# Patient Record
Sex: MALE | Race: WHITE | NOT HISPANIC OR LATINO | Employment: FULL TIME | URBAN - METROPOLITAN AREA
[De-identification: names, ages, dates, MRNs, and addresses within clinical notes are randomized per-mention and may not be internally consistent; named-entity substitution may affect disease eponyms.]

---

## 2024-02-10 ASSESSMENT — ENCOUNTER SYMPTOMS
NAUSEA: 0
SHORTNESS OF BREATH: 0
FATIGUE: 0
DIZZINESS: 0
VOMITING: 0
COUGH: 0
FEVER: 0
MYALGIAS: 0

## 2024-02-10 NOTE — PROGRESS NOTES
Subjective   Patient ID: Jacky Duong is a 38 y.o. male who presents for Establish Care (Pt is here to establish care / nr), Mass (Pt would like to discuss about the bump on left eye / nr), Anxiety (Pt would like to discuss about his anxiety that has been going on for about a year / nr), and blood work (Pt would like to have basic bloodwork orders ).    HPI   L lower eye skin tag - requesting removal.    Anxiety - intrusive thoughts, dwelling on upcoming events. Boys w/ADHD + anxiety. Rare physical sxs. Has never been treated for anxiety. Is interested in medication but wary of SSRI side effects.        Review of Systems   Constitutional:  Negative for fatigue and fever.   Respiratory:  Negative for cough and shortness of breath.    Cardiovascular:  Negative for chest pain.   Gastrointestinal:  Negative for nausea and vomiting.   Musculoskeletal:  Negative for myalgias.   Skin:  Negative for rash.   Neurological:  Negative for dizziness.   Psychiatric/Behavioral:  The patient is nervous/anxious.        Objective   /76   Pulse 82   Wt 100 kg (221 lb 3.2 oz)   SpO2 98%     Physical Exam  Vitals reviewed.   Constitutional:       Appearance: Normal appearance.   HENT:      Head: Normocephalic and atraumatic.      Right Ear: There is impacted cerumen.      Left Ear: There is impacted cerumen.      Nose: Nose normal.      Mouth/Throat:      Mouth: Mucous membranes are moist.      Pharynx: No oropharyngeal exudate.   Eyes:      Extraocular Movements: Extraocular movements intact.      Conjunctiva/sclera: Conjunctivae normal.      Pupils: Pupils are equal, round, and reactive to light.   Cardiovascular:      Rate and Rhythm: Normal rate and regular rhythm.      Heart sounds: Normal heart sounds.   Pulmonary:      Effort: Pulmonary effort is normal.      Breath sounds: Normal breath sounds. No wheezing.   Abdominal:      General: There is no distension.      Palpations: Abdomen is soft.      Tenderness: There  is no abdominal tenderness.   Musculoskeletal:         General: No tenderness.      Cervical back: Normal range of motion and neck supple.   Skin:     General: Skin is warm and dry.      Findings: No rash.      Comments: Skin tag L lower eye   Neurological:      General: No focal deficit present.      Mental Status: He is alert. Mental status is at baseline.   Psychiatric:         Mood and Affect: Mood normal.         Assessment/Plan   Problem List Items Addressed This Visit             ICD-10-CM    Skin tag L91.8     Informed consent obtained. Area cleansed with alcohol swab. Forceps used to lift the skin tag - excised at the base with sterile scissors. Minimal to no bleeding. Covered with bacitracin. Return precautions discussed.          Other Visit Diagnoses         Codes    Encounter to establish care    -  Primary Z76.89    Lipid screening     Z13.220    Relevant Orders    Lipid Panel    Screening for diabetes mellitus     Z13.1    Relevant Orders    Comprehensive Metabolic Panel    BENOIT (generalized anxiety disorder)     F41.1    Relevant Medications    busPIRone (Buspar) 5 mg tablet          Will start w/buspar. To FU in 2 weeks for dose adjustment.

## 2024-02-12 ENCOUNTER — OFFICE VISIT (OUTPATIENT)
Dept: PRIMARY CARE | Facility: CLINIC | Age: 39
End: 2024-02-12
Payer: COMMERCIAL

## 2024-02-12 ENCOUNTER — LAB (OUTPATIENT)
Dept: LAB | Facility: LAB | Age: 39
End: 2024-02-12
Payer: COMMERCIAL

## 2024-02-12 VITALS
SYSTOLIC BLOOD PRESSURE: 132 MMHG | OXYGEN SATURATION: 98 % | WEIGHT: 221.2 LBS | DIASTOLIC BLOOD PRESSURE: 76 MMHG | HEART RATE: 82 BPM

## 2024-02-12 DIAGNOSIS — Z13.220 LIPID SCREENING: ICD-10-CM

## 2024-02-12 DIAGNOSIS — Z13.1 SCREENING FOR DIABETES MELLITUS: ICD-10-CM

## 2024-02-12 DIAGNOSIS — F41.1 GAD (GENERALIZED ANXIETY DISORDER): ICD-10-CM

## 2024-02-12 DIAGNOSIS — H61.23 BILATERAL IMPACTED CERUMEN: ICD-10-CM

## 2024-02-12 DIAGNOSIS — L91.8 SKIN TAG: ICD-10-CM

## 2024-02-12 DIAGNOSIS — Z76.89 ENCOUNTER TO ESTABLISH CARE: Primary | ICD-10-CM

## 2024-02-12 LAB
ALBUMIN SERPL-MCNC: 5 G/DL (ref 3.5–5)
ALP BLD-CCNC: 53 U/L (ref 35–125)
ALT SERPL-CCNC: 101 U/L (ref 5–40)
ANION GAP SERPL CALC-SCNC: 12 MMOL/L
AST SERPL-CCNC: 45 U/L (ref 5–40)
BILIRUB SERPL-MCNC: 0.6 MG/DL (ref 0.1–1.2)
BUN SERPL-MCNC: 15 MG/DL (ref 8–25)
CALCIUM SERPL-MCNC: 9.7 MG/DL (ref 8.5–10.4)
CHLORIDE SERPL-SCNC: 102 MMOL/L (ref 97–107)
CHOLEST SERPL-MCNC: 188 MG/DL (ref 133–200)
CHOLEST/HDLC SERPL: 5.4 {RATIO}
CO2 SERPL-SCNC: 25 MMOL/L (ref 24–31)
CREAT SERPL-MCNC: 0.8 MG/DL (ref 0.4–1.6)
EGFRCR SERPLBLD CKD-EPI 2021: >90 ML/MIN/1.73M*2
GLUCOSE SERPL-MCNC: 98 MG/DL (ref 65–99)
HDLC SERPL-MCNC: 35 MG/DL
LDLC SERPL CALC-MCNC: 111 MG/DL (ref 65–130)
POTASSIUM SERPL-SCNC: 4.2 MMOL/L (ref 3.4–5.1)
PROT SERPL-MCNC: 7.8 G/DL (ref 5.9–7.9)
SODIUM SERPL-SCNC: 139 MMOL/L (ref 133–145)
TRIGL SERPL-MCNC: 208 MG/DL (ref 40–150)

## 2024-02-12 PROCEDURE — 80061 LIPID PANEL: CPT

## 2024-02-12 PROCEDURE — 80053 COMPREHEN METABOLIC PANEL: CPT

## 2024-02-12 PROCEDURE — 36415 COLL VENOUS BLD VENIPUNCTURE: CPT

## 2024-02-12 PROCEDURE — 11200 RMVL SKIN TAGS UP TO&INC 15: CPT | Performed by: PHYSICIAN ASSISTANT

## 2024-02-12 PROCEDURE — 99203 OFFICE O/P NEW LOW 30 MIN: CPT | Performed by: PHYSICIAN ASSISTANT

## 2024-02-12 PROCEDURE — 1036F TOBACCO NON-USER: CPT | Performed by: PHYSICIAN ASSISTANT

## 2024-02-12 RX ORDER — BUSPIRONE HYDROCHLORIDE 5 MG/1
5 TABLET ORAL 2 TIMES DAILY
Qty: 60 TABLET | Refills: 1 | Status: SHIPPED | OUTPATIENT
Start: 2024-02-12 | End: 2024-03-04

## 2024-02-12 ASSESSMENT — PATIENT HEALTH QUESTIONNAIRE - PHQ9
3. TROUBLE FALLING OR STAYING ASLEEP OR SLEEPING TOO MUCH: SEVERAL DAYS
7. TROUBLE CONCENTRATING ON THINGS, SUCH AS READING THE NEWSPAPER OR WATCHING TELEVISION: SEVERAL DAYS
6. FEELING BAD ABOUT YOURSELF - OR THAT YOU ARE A FAILURE OR HAVE LET YOURSELF OR YOUR FAMILY DOWN: NOT AT ALL
SUM OF ALL RESPONSES TO PHQ9 QUESTIONS 1 AND 2: 6
SUM OF ALL RESPONSES TO PHQ QUESTIONS 1-9: 12
10. IF YOU CHECKED OFF ANY PROBLEMS, HOW DIFFICULT HAVE THESE PROBLEMS MADE IT FOR YOU TO DO YOUR WORK, TAKE CARE OF THINGS AT HOME, OR GET ALONG WITH OTHER PEOPLE: SOMEWHAT DIFFICULT
5. POOR APPETITE OR OVEREATING: NOT AT ALL
9. THOUGHTS THAT YOU WOULD BE BETTER OFF DEAD, OR OF HURTING YOURSELF: NOT AT ALL
1. LITTLE INTEREST OR PLEASURE IN DOING THINGS: NOT AT ALL
1. LITTLE INTEREST OR PLEASURE IN DOING THINGS: NEARLY EVERY DAY
SUM OF ALL RESPONSES TO PHQ9 QUESTIONS 1 AND 2: 0
2. FEELING DOWN, DEPRESSED OR HOPELESS: NEARLY EVERY DAY
4. FEELING TIRED OR HAVING LITTLE ENERGY: NEARLY EVERY DAY
2. FEELING DOWN, DEPRESSED OR HOPELESS: NOT AT ALL
8. MOVING OR SPEAKING SO SLOWLY THAT OTHER PEOPLE COULD HAVE NOTICED. OR THE OPPOSITE, BEING SO FIGETY OR RESTLESS THAT YOU HAVE BEEN MOVING AROUND A LOT MORE THAN USUAL: SEVERAL DAYS

## 2024-02-12 ASSESSMENT — PAIN SCALES - GENERAL: PAINLEVEL: 0-NO PAIN

## 2024-02-12 ASSESSMENT — COLUMBIA-SUICIDE SEVERITY RATING SCALE - C-SSRS
2. HAVE YOU ACTUALLY HAD ANY THOUGHTS OF KILLING YOURSELF?: NO
6. HAVE YOU EVER DONE ANYTHING, STARTED TO DO ANYTHING, OR PREPARED TO DO ANYTHING TO END YOUR LIFE?: NO
1. IN THE PAST MONTH, HAVE YOU WISHED YOU WERE DEAD OR WISHED YOU COULD GO TO SLEEP AND NOT WAKE UP?: NO

## 2024-02-12 ASSESSMENT — ENCOUNTER SYMPTOMS: NERVOUS/ANXIOUS: 1

## 2024-02-12 NOTE — ASSESSMENT & PLAN NOTE
Informed consent obtained. Area cleansed with alcohol swab. Forceps used to lift the skin tag - excised at the base with sterile scissors. Minimal to no bleeding. Covered with bacitracin. Return precautions discussed.

## 2024-03-03 ASSESSMENT — ENCOUNTER SYMPTOMS
COUGH: 0
FATIGUE: 0
SHORTNESS OF BREATH: 0
VOMITING: 0
FEVER: 0
NAUSEA: 0
MYALGIAS: 0
DIZZINESS: 0

## 2024-03-03 NOTE — PROGRESS NOTES
Subjective   Patient ID: Jacky Duong is a 38 y.o. male who presents for Follow-up (Pt is here for medication and lab results  / nr).    HPI   With patient's permission, this is a Telemedicine visit with video and audio. All issues as documented below were discussed and addressed but limited physical exam was performed. If it was felt that the patient should be evaluated via face-to-face office appointment(s) they were directed there.    Last seen 1mo ago for anxiety - recall: intrusive thoughts, dwelling on upcoming events. Boys w/ADHD + anxiety. Rare physical sxs. Was concerned about SE of SSRI so we started him on low dose buspar with plans to increase as needed.    Today: notes some improvement but not overwhelming. No adverse SE. Agrees to increase dose.    CMP w/elevated liver enzymes. Drinks EtOH socially, 2 beers/week. Rare APAP. Denies abd pain but does have significant acid reflux. Had a few colonoscopies + EGD in his 20s for acid reflux. Found some polyps but otherwise unremarkable.    TC:HDL 5.4 (HDL 35, , )        Review of Systems   Constitutional:  Negative for fatigue and fever.   Respiratory:  Negative for cough and shortness of breath.    Cardiovascular:  Negative for chest pain.   Gastrointestinal:  Negative for nausea and vomiting.   Musculoskeletal:  Negative for myalgias.   Skin:  Negative for rash.   Neurological:  Negative for dizziness.       Objective   There were no vitals taken for this visit.    Physical Exam  Constitutional:       Appearance: Normal appearance.   HENT:      Head: Normocephalic and atraumatic.   Eyes:      Extraocular Movements: Extraocular movements intact.      Conjunctiva/sclera: Conjunctivae normal.   Pulmonary:      Effort: Pulmonary effort is normal.   Musculoskeletal:      Cervical back: Normal range of motion and neck supple.   Skin:     Findings: No rash.   Neurological:      Mental Status: He is alert.   Psychiatric:         Mood and Affect:  Mood normal.       Limited exam due to telemed visit.              Assessment/Plan   Problem List Items Addressed This Visit             ICD-10-CM    BENOIT (generalized anxiety disorder) - Primary F41.1    Relevant Medications    busPIRone (Buspar) 10 mg tablet     Other Visit Diagnoses         Codes    Elevated LFTs     R79.89    Relevant Orders    US abdomen limited liver

## 2024-03-04 ENCOUNTER — TELEMEDICINE (OUTPATIENT)
Dept: PRIMARY CARE | Facility: CLINIC | Age: 39
End: 2024-03-04
Payer: COMMERCIAL

## 2024-03-04 DIAGNOSIS — F41.1 GAD (GENERALIZED ANXIETY DISORDER): Primary | ICD-10-CM

## 2024-03-04 DIAGNOSIS — R79.89 ELEVATED LFTS: ICD-10-CM

## 2024-03-04 PROCEDURE — 99213 OFFICE O/P EST LOW 20 MIN: CPT | Performed by: PHYSICIAN ASSISTANT

## 2024-03-04 PROCEDURE — 1036F TOBACCO NON-USER: CPT | Performed by: PHYSICIAN ASSISTANT

## 2024-03-04 RX ORDER — BUSPIRONE HYDROCHLORIDE 10 MG/1
10 TABLET ORAL 2 TIMES DAILY
Qty: 180 TABLET | Refills: 1 | Status: SHIPPED | OUTPATIENT
Start: 2024-03-04 | End: 2024-08-31

## 2024-03-04 ASSESSMENT — PATIENT HEALTH QUESTIONNAIRE - PHQ9
1. LITTLE INTEREST OR PLEASURE IN DOING THINGS: NOT AT ALL
2. FEELING DOWN, DEPRESSED OR HOPELESS: NOT AT ALL
SUM OF ALL RESPONSES TO PHQ9 QUESTIONS 1 AND 2: 0

## 2024-03-04 ASSESSMENT — COLUMBIA-SUICIDE SEVERITY RATING SCALE - C-SSRS
1. IN THE PAST MONTH, HAVE YOU WISHED YOU WERE DEAD OR WISHED YOU COULD GO TO SLEEP AND NOT WAKE UP?: NO
6. HAVE YOU EVER DONE ANYTHING, STARTED TO DO ANYTHING, OR PREPARED TO DO ANYTHING TO END YOUR LIFE?: NO
2. HAVE YOU ACTUALLY HAD ANY THOUGHTS OF KILLING YOURSELF?: NO

## 2024-03-15 ENCOUNTER — APPOINTMENT (OUTPATIENT)
Dept: RADIOLOGY | Facility: CLINIC | Age: 39
End: 2024-03-15
Payer: COMMERCIAL

## 2024-03-20 ENCOUNTER — HOSPITAL ENCOUNTER (OUTPATIENT)
Dept: RADIOLOGY | Facility: CLINIC | Age: 39
Discharge: HOME | End: 2024-03-20
Payer: COMMERCIAL

## 2024-03-20 DIAGNOSIS — R79.89 ELEVATED LFTS: ICD-10-CM

## 2024-03-20 PROCEDURE — 76705 ECHO EXAM OF ABDOMEN: CPT

## 2024-03-20 PROCEDURE — 76705 ECHO EXAM OF ABDOMEN: CPT | Performed by: STUDENT IN AN ORGANIZED HEALTH CARE EDUCATION/TRAINING PROGRAM

## 2025-06-30 ENCOUNTER — APPOINTMENT (OUTPATIENT)
Dept: PRIMARY CARE | Facility: CLINIC | Age: 40
End: 2025-06-30
Payer: COMMERCIAL

## 2025-06-30 VITALS
HEIGHT: 73 IN | DIASTOLIC BLOOD PRESSURE: 72 MMHG | BODY MASS INDEX: 26.32 KG/M2 | SYSTOLIC BLOOD PRESSURE: 138 MMHG | WEIGHT: 198.6 LBS

## 2025-06-30 DIAGNOSIS — R41.840 DIFFICULTY CONCENTRATING: ICD-10-CM

## 2025-06-30 DIAGNOSIS — Z00.00 ANNUAL PHYSICAL EXAM: ICD-10-CM

## 2025-06-30 DIAGNOSIS — Z00.00 HEALTHCARE MAINTENANCE: ICD-10-CM

## 2025-06-30 PROBLEM — R79.89 ELEVATED LIVER FUNCTION TESTS: Status: ACTIVE | Noted: 2024-02-12

## 2025-06-30 PROCEDURE — 3008F BODY MASS INDEX DOCD: CPT | Performed by: INTERNAL MEDICINE

## 2025-06-30 PROCEDURE — 99385 PREV VISIT NEW AGE 18-39: CPT | Performed by: INTERNAL MEDICINE

## 2025-06-30 NOTE — PROGRESS NOTES
"  Patient Subjective   Patient ID: Jacky Duong is a 39 y.o. male who presents for hospitals Care ([facility] ).    HPI   New patient visit  For physical  Is having issues with focusing with anxiety.  He was on BuSpar in the past but did not work.  His dose was diagnosed with ADHD and he feels he has ADHD  He has lost 40 pounds and trying to live healthy lifestyle.  His liver function was elevated  He wants his measles vaccine status checked    Past medical history: Fatty liver, LASIK, anxiety  Social history: Non-smoker few drinks per week no drugs  Occupation: IT healthcare  Family history: Father: Kidney disease mother Hodgkin's lymphoma  Review of Systems    Objective   /72   Ht 1.854 m (6' 1\")   Wt 90.1 kg (198 lb 9.6 oz)   BMI 26.20 kg/m²     Physical Exam  Vitals reviewed.   Constitutional:       Appearance: Normal appearance.   HENT:      Head: Normocephalic and atraumatic.      Right Ear: Tympanic membrane, ear canal and external ear normal.      Left Ear: Tympanic membrane, ear canal and external ear normal.      Nose: Nose normal.      Mouth/Throat:      Pharynx: Oropharynx is clear.   Eyes:      Extraocular Movements: Extraocular movements intact.      Conjunctiva/sclera: Conjunctivae normal.      Pupils: Pupils are equal, round, and reactive to light.   Cardiovascular:      Rate and Rhythm: Normal rate and regular rhythm.      Pulses: Normal pulses.      Heart sounds: Normal heart sounds.   Pulmonary:      Effort: Pulmonary effort is normal.      Breath sounds: Normal breath sounds.   Abdominal:      General: Abdomen is flat. Bowel sounds are normal.      Palpations: Abdomen is soft.   Musculoskeletal:      Cervical back: Normal range of motion and neck supple.   Skin:     General: Skin is warm and dry.   Neurological:      General: No focal deficit present.      Mental Status: He is alert and oriented to person, place, and time.   Psychiatric:         Mood and Affect: Mood normal. "         Assessment/Plan   Problem List Items Addressed This Visit    None  Visit Diagnoses         Codes      Annual physical exam     Z00.00    Relevant Orders    CBC    Comprehensive Metabolic Panel    Lipid Panel    Vitamin B12    Vitamin D 25-Hydroxy,Total (for eval of Vitamin D levels)    Hemoglobin A1C      Difficulty concentrating     R41.840    Relevant Orders    Referral to Psychiatry      Healthcare maintenance     Z00.00    Relevant Orders    Measles (Rubeola) Antibody, IgG        Physical normal  Routine blood work ordered  Measles titer ordered  Refer to psychiatrist for difficulty concentrating possibility of underlying ADHD

## 2025-07-13 LAB
25(OH)D3+25(OH)D2 SERPL-MCNC: 31 NG/ML (ref 30–100)
ALBUMIN SERPL-MCNC: 5.2 G/DL (ref 3.6–5.1)
ALP SERPL-CCNC: 39 U/L (ref 36–130)
ALT SERPL-CCNC: 22 U/L (ref 9–46)
ANION GAP SERPL CALCULATED.4IONS-SCNC: 9 MMOL/L (CALC) (ref 7–17)
AST SERPL-CCNC: 16 U/L (ref 10–40)
BILIRUB SERPL-MCNC: 0.6 MG/DL (ref 0.2–1.2)
BUN SERPL-MCNC: 18 MG/DL (ref 7–25)
CALCIUM SERPL-MCNC: 9.5 MG/DL (ref 8.6–10.3)
CHLORIDE SERPL-SCNC: 104 MMOL/L (ref 98–110)
CHOLEST SERPL-MCNC: 232 MG/DL
CHOLEST/HDLC SERPL: 5.2 (CALC)
CO2 SERPL-SCNC: 26 MMOL/L (ref 20–32)
CREAT SERPL-MCNC: 0.65 MG/DL (ref 0.6–1.26)
EGFRCR SERPLBLD CKD-EPI 2021: 124 ML/MIN/1.73M2
ERYTHROCYTE [DISTWIDTH] IN BLOOD BY AUTOMATED COUNT: 13.1 % (ref 11–15)
EST. AVERAGE GLUCOSE BLD GHB EST-MCNC: 114 MG/DL
EST. AVERAGE GLUCOSE BLD GHB EST-SCNC: 6.3 MMOL/L
GLUCOSE SERPL-MCNC: 95 MG/DL (ref 65–99)
HBA1C MFR BLD: 5.6 %
HCT VFR BLD AUTO: 46.6 % (ref 38.5–50)
HDLC SERPL-MCNC: 45 MG/DL
HGB BLD-MCNC: 15.8 G/DL (ref 13.2–17.1)
LDLC SERPL CALC-MCNC: 156 MG/DL (CALC)
MCH RBC QN AUTO: 30.6 PG (ref 27–33)
MCHC RBC AUTO-ENTMCNC: 33.9 G/DL (ref 32–36)
MCV RBC AUTO: 90.3 FL (ref 80–100)
MEV IGG SER IA-ACNC: NORMAL
NONHDLC SERPL-MCNC: 187 MG/DL (CALC)
PLATELET # BLD AUTO: 223 THOUSAND/UL (ref 140–400)
PMV BLD REES-ECKER: 10.7 FL (ref 7.5–12.5)
POTASSIUM SERPL-SCNC: 4.2 MMOL/L (ref 3.5–5.3)
PROT SERPL-MCNC: 8 G/DL (ref 6.1–8.1)
RBC # BLD AUTO: 5.16 MILLION/UL (ref 4.2–5.8)
SODIUM SERPL-SCNC: 139 MMOL/L (ref 135–146)
TRIGL SERPL-MCNC: 176 MG/DL
VIT B12 SERPL-MCNC: 766 PG/ML (ref 200–1100)
WBC # BLD AUTO: 5.9 THOUSAND/UL (ref 3.8–10.8)

## 2025-07-14 LAB
25(OH)D3+25(OH)D2 SERPL-MCNC: 31 NG/ML (ref 30–100)
ALBUMIN SERPL-MCNC: 5.2 G/DL (ref 3.6–5.1)
ALP SERPL-CCNC: 39 U/L (ref 36–130)
ALT SERPL-CCNC: 22 U/L (ref 9–46)
ANION GAP SERPL CALCULATED.4IONS-SCNC: 9 MMOL/L (CALC) (ref 7–17)
AST SERPL-CCNC: 16 U/L (ref 10–40)
BILIRUB SERPL-MCNC: 0.6 MG/DL (ref 0.2–1.2)
BUN SERPL-MCNC: 18 MG/DL (ref 7–25)
CALCIUM SERPL-MCNC: 9.5 MG/DL (ref 8.6–10.3)
CHLORIDE SERPL-SCNC: 104 MMOL/L (ref 98–110)
CHOLEST SERPL-MCNC: 232 MG/DL
CHOLEST/HDLC SERPL: 5.2 (CALC)
CO2 SERPL-SCNC: 26 MMOL/L (ref 20–32)
CREAT SERPL-MCNC: 0.65 MG/DL (ref 0.6–1.26)
EGFRCR SERPLBLD CKD-EPI 2021: 124 ML/MIN/1.73M2
ERYTHROCYTE [DISTWIDTH] IN BLOOD BY AUTOMATED COUNT: 13.1 % (ref 11–15)
EST. AVERAGE GLUCOSE BLD GHB EST-MCNC: 114 MG/DL
EST. AVERAGE GLUCOSE BLD GHB EST-SCNC: 6.3 MMOL/L
GLUCOSE SERPL-MCNC: 95 MG/DL (ref 65–99)
HBA1C MFR BLD: 5.6 %
HCT VFR BLD AUTO: 46.6 % (ref 38.5–50)
HDLC SERPL-MCNC: 45 MG/DL
HGB BLD-MCNC: 15.8 G/DL (ref 13.2–17.1)
LDLC SERPL CALC-MCNC: 156 MG/DL (CALC)
MCH RBC QN AUTO: 30.6 PG (ref 27–33)
MCHC RBC AUTO-ENTMCNC: 33.9 G/DL (ref 32–36)
MCV RBC AUTO: 90.3 FL (ref 80–100)
MEV IGG SER IA-ACNC: >300 AU/ML
NONHDLC SERPL-MCNC: 187 MG/DL (CALC)
PLATELET # BLD AUTO: 223 THOUSAND/UL (ref 140–400)
PMV BLD REES-ECKER: 10.7 FL (ref 7.5–12.5)
POTASSIUM SERPL-SCNC: 4.2 MMOL/L (ref 3.5–5.3)
PROT SERPL-MCNC: 8 G/DL (ref 6.1–8.1)
RBC # BLD AUTO: 5.16 MILLION/UL (ref 4.2–5.8)
SODIUM SERPL-SCNC: 139 MMOL/L (ref 135–146)
TRIGL SERPL-MCNC: 176 MG/DL
VIT B12 SERPL-MCNC: 766 PG/ML (ref 200–1100)
WBC # BLD AUTO: 5.9 THOUSAND/UL (ref 3.8–10.8)

## 2025-07-29 ENCOUNTER — DOCUMENTATION (OUTPATIENT)
Dept: BEHAVIORAL HEALTH | Facility: CLINIC | Age: 40
End: 2025-07-29

## 2025-07-29 ENCOUNTER — APPOINTMENT (OUTPATIENT)
Dept: BEHAVIORAL HEALTH | Facility: CLINIC | Age: 40
End: 2025-07-29
Payer: COMMERCIAL

## 2025-07-29 DIAGNOSIS — R41.840 INATTENTION: ICD-10-CM

## 2025-07-29 DIAGNOSIS — F41.9 UNSPECIFIED ANXIETY DISORDER: ICD-10-CM

## 2025-07-29 PROCEDURE — 99205 OFFICE O/P NEW HI 60 MIN: CPT

## 2025-07-29 PROCEDURE — 1036F TOBACCO NON-USER: CPT

## 2025-07-29 RX ORDER — GUANFACINE 1 MG/1
1 TABLET ORAL NIGHTLY
Qty: 30 TABLET | Refills: 2 | Status: SHIPPED | OUTPATIENT
Start: 2025-07-29 | End: 2025-10-27

## 2025-07-29 RX ORDER — PROPRANOLOL HYDROCHLORIDE 10 MG/1
10 TABLET ORAL DAILY PRN
Qty: 30 TABLET | Refills: 2 | Status: SHIPPED | OUTPATIENT
Start: 2025-07-29 | End: 2026-07-29

## 2025-07-29 ASSESSMENT — PATIENT HEALTH QUESTIONNAIRE - PHQ9
7. TROUBLE CONCENTRATING ON THINGS, SUCH AS READING THE NEWSPAPER OR WATCHING TELEVISION: NEARLY EVERY DAY
3. TROUBLE FALLING OR STAYING ASLEEP OR SLEEPING TOO MUCH: SEVERAL DAYS
5. POOR APPETITE OR OVEREATING: SEVERAL DAYS
9. THOUGHTS THAT YOU WOULD BE BETTER OFF DEAD, OR OF HURTING YOURSELF: NOT AT ALL
4. FEELING TIRED OR HAVING LITTLE ENERGY: SEVERAL DAYS
10. IF YOU CHECKED OFF ANY PROBLEMS, HOW DIFFICULT HAVE THESE PROBLEMS MADE IT FOR YOU TO DO YOUR WORK, TAKE CARE OF THINGS AT HOME, OR GET ALONG WITH OTHER PEOPLE: SOMEWHAT DIFFICULT
1. LITTLE INTEREST OR PLEASURE IN DOING THINGS: SEVERAL DAYS
2. FEELING DOWN, DEPRESSED OR HOPELESS: NOT AT ALL
8. MOVING OR SPEAKING SO SLOWLY THAT OTHER PEOPLE COULD HAVE NOTICED. OR THE OPPOSITE, BEING SO FIGETY OR RESTLESS THAT YOU HAVE BEEN MOVING AROUND A LOT MORE THAN USUAL: NOT AT ALL
6. FEELING BAD ABOUT YOURSELF - OR THAT YOU ARE A FAILURE OR HAVE LET YOURSELF OR YOUR FAMILY DOWN: NOT AT ALL

## 2025-07-29 ASSESSMENT — ANXIETY QUESTIONNAIRES
GAD7 TOTAL SCORE: 11
3. WORRYING TOO MUCH ABOUT DIFFERENT THINGS: MORE THAN HALF THE DAYS
6. BECOMING EASILY ANNOYED OR IRRITABLE: SEVERAL DAYS
5. BEING SO RESTLESS THAT IT IS HARD TO SIT STILL: MORE THAN HALF THE DAYS
1. FEELING NERVOUS, ANXIOUS, OR ON EDGE: NEARLY EVERY DAY
7. FEELING AFRAID AS IF SOMETHING AWFUL MIGHT HAPPEN: NOT AT ALL
4. TROUBLE RELAXING: SEVERAL DAYS
2. NOT BEING ABLE TO STOP OR CONTROL WORRYING: MORE THAN HALF THE DAYS

## 2025-07-29 NOTE — PROGRESS NOTES
"Outpatient Psychiatry- Virtual    Virtual or Telephone Consent    An interactive audio and video telecommunication system which permits real time communications between the patient (at the originating site) and provider (at the distant site) was utilized to provide this telehealth service.   Verbal consent was requested and obtained from Jacky Duong on this date, 07/29/25 for a telehealth visit and the patient's location was confirmed at the time of the visit.     Subjective   Jacky Duong, a 39 y.o. male, presenting to Psychiatry for evaluation.  Patient is referred by Africa Alatorre MD .  PMHx of GERD.     HPI:  \"I've been feeling some degree of anxiety for the last couple of years\".    Trialed buspirone (10mg bid) recently, experienced headaches daily.    Also, \"Suspect I might have ADHD\".    2-3 yrs has noticed anxious sxs, ~5295-9137.  Job was a little more stressful at the time, denies notable precipitating events/stressors.  Denies mood sxs prior to this, and denies most current depressive sxs.    Anxiety heightened in large crowds, will tailor plans based upon this fact.    Some fatigue, though attributes this to 3 children, job, etc.     Focus deficits, especially at work.  Progressive as the day goes on.  Easily distracted.  Works remotely and kids are home for the summer.  Procrastination.   Notices caffeine will help short-term.   Feels he became aware of the attention deficits also within the last 2-3 yrs.    Does not recall this being an issue in school, however did struggle w/procrastination/turning in assignments consistently.     Sleep-denies difficulty w/latency.  Occasional awakenings, after awakening, thoughts will start to race, delaying ability to fall back asleep  Appetite-good, denies disturbances;  vegetarian/keto diet x 1yr. And has lost ~40# intentionally.    Denies substance use save occasional EtOH.    Negative for manic criteria  Denies current/recent SI.  Denies hx of " psychotic symptomology, no evidence of internal preoccupation.      Upon review of treatment options to include SSRI vs PRN anxiolytic (propranolol, hydroxyzine), non-stimulant options for inattention, and counseling r/t medication profiles, Rs vs Bs, target sxs, and benefit timelines/expectations, the pt is agreeable to trial prn propranolol, guanfacine nightly (notes both of his sons have taken this agent).  Additionally, he expresses interest in outpt psychotherapy to aid in stressor mgmt.        Psychiatric Review Of Systems:  Depressive Symptoms: concentration and sleep decreased   Manic Symptoms: negative  Anxiety Symptoms: General Anxiety Disorder (BENOIT)BENOIT Behaviors: difficult to control worry, excessive anxiety/worry, difficulty concentrating, easily fatigued, and sleep disturbance  Psychotic Symptoms: negative  Other Symptoms:  denies OCD sxs    Initial Screenings:  PHQ-9:  9  BENOIT-7:  11    Current Medications:  Current Medications[1]    Medical History:  Medical History[2]    Past Psychiatric History:   Diagnoses:   BENOIT  Previous Psychiatrist:  none  Therapy:  never  Current psychiatric medications:  Past psychiatric medications:  buspirone;  remote SSRI trial (cannot recall which) for GI sxs(?)  Hospitalizations:  denies  Suicidal Ideation/Attempts, Self-Harm hx:  denies all   Family psychiatric history:  oldest son-anxiety/ADHD, ASD?; middle son-ADHD;  friend suicided during Covid;  denies SUDs    Social History:   Childhood:  b/r [place], bio mom/dad-still alive/, mom stay at home; 1 younger sister;  good childhood  Currently lives:  wife/kids  Education:  Bachelors  History of Learning Problem:  some HS procrastination  Work/Finances:  works remotely; healthcare IT x 15 yrs.   Marital history/children:  13 yrs;  safe/supported;  3 kids, 17/11, 9  Current stressors:  MH sxs  Social support: family  Trauma History:  denies  Legal History:   denies   History:  denies  History of violence:  denies  Access to Weapons:  denies  Guardian/POA/Payee:  N/a    Substance Use History:  Tobacco use:  prev sporadic use;  none recent  Use of alcohol: 2-3 drinks/wkly  Use of caffeine: daily, 2-3 sodas;  occasional energy drink  Use of other substances:  denies  Legal consequences of substance use:  denies  Substance use disorder treatment:  denies    Record Review: brief     Medical Review Of Systems:  Gastrointestinal: positive for reflux symptoms    OARRS:  Shaye Jj, APRN-CNP on 7/29/2025  7:19 AM  I have personally reviewed the OARRS report for Jacky Duong. I have considered the risks of abuse, dependence, addiction and diversion  Is the patient prescribed a combination of a benzodiazepine and opioid?  No  Last Urine Drug Screen / ordered today: No  No results found for this or any previous visit (from the past 8760 hours).  N/A  Clinical rationale for not completing a Urine Drug Screen: no controlled prescribing    Objective   Mental Status Exam  Appearance:  appropriate grooming, good eye contact seated at home  Attitude: Calm, cooperative, and engaged in conversation.  Motor Activity: No psychomotor agitation or retardation. No abnormal movements, tremors or tics. No evidence of extrapyramidal symptoms or tardive dyskinesia.  Speech: Regular rate, rhythm, volume. Spontaneous, no pressured speech.  Mood: non-distressed, no overt dysphoria, endorsing mod anxiety sxs  Affect: mildly constricted;  nonn-labile, mood congruent.  Thought Process: Linear, logical, and goal-directed. No loose associations or gross thought disorganization.  Thought Content: Denied current suicidal ideation or thoughts of harm to self, denied homicidal ideation or thoughts of harm to others. No delusional thinking elicited. No perseverations or obsessions identified.   Perception: Did not endorse auditory or visual hallucinations, did not appear to be responding to hallucinatory stimuli.   Cognition: Alert, oriented x3.  Preserved attention span and concentration, recent and remote memory. Adequate fund of knowledge. No deficits in language.   Insight:  Good-acknowledges/discusses MH sxs;  agreeable to treatment/treatment recs  Judgement:  Appropriate, help-seeking    Vitals:  There were no vitals filed for this visit.    Other Objective Information:  Office Visit on 06/30/2025   Component Date Value Ref Range Status    MEASLES AB (IGG), IMMUNE STATUS 07/12/2025 >300.00  AU/mL Final    Comment: AU/mL            Interpretation  -----            --------------  <13.50           Not consistent with immunity  13.50-16.49      Equivocal  >16.49           Consistent with immunity     The presence of measles IgG suggests immunization or  past or current infection with measles virus.     For additional information, please refer to  http://Smartsheet.Student [employer]/faq/XTM213  (This link is being provided for informational/  educational purposes only.)         WHITE BLOOD CELL COUNT 07/12/2025 5.9  3.8 - 10.8 Thousand/uL Final    RED BLOOD CELL COUNT 07/12/2025 5.16  4.20 - 5.80 Million/uL Final    HEMOGLOBIN 07/12/2025 15.8  13.2 - 17.1 g/dL Final    HEMATOCRIT 07/12/2025 46.6  38.5 - 50.0 % Final    MCV 07/12/2025 90.3  80.0 - 100.0 fL Final    MCH 07/12/2025 30.6  27.0 - 33.0 pg Final    MCHC 07/12/2025 33.9  32.0 - 36.0 g/dL Final    Comment: For adults, a slight decrease in the calculated MCHC  value (in the range of 30 to 32 g/dL) is most likely  not clinically significant; however, it should be  interpreted with caution in correlation with other  red cell parameters and the patient's clinical  condition.      RDW 07/12/2025 13.1  11.0 - 15.0 % Final    PLATELET COUNT 07/12/2025 223  140 - 400 Thousand/uL Final    MPV 07/12/2025 10.7  7.5 - 12.5 fL Final    GLUCOSE 07/12/2025 95  65 - 99 mg/dL Final    Comment:               Fasting reference interval         UREA NITROGEN (BUN) 07/12/2025 18  7 - 25 mg/dL Final    CREATININE  07/12/2025 0.65  0.60 - 1.26 mg/dL Final    EGFR 07/12/2025 124  > OR = 60 mL/min/1.73m2 Final    SODIUM 07/12/2025 139  135 - 146 mmol/L Final    POTASSIUM 07/12/2025 4.2  3.5 - 5.3 mmol/L Final    CHLORIDE 07/12/2025 104  98 - 110 mmol/L Final    CARBON DIOXIDE 07/12/2025 26  20 - 32 mmol/L Final    ELECTROLYTE BALANCE 07/12/2025 9  7 - 17 mmol/L (calc) Final    CALCIUM 07/12/2025 9.5  8.6 - 10.3 mg/dL Final    PROTEIN, TOTAL 07/12/2025 8.0  6.1 - 8.1 g/dL Final    ALBUMIN 07/12/2025 5.2 (H)  3.6 - 5.1 g/dL Final    BILIRUBIN, TOTAL 07/12/2025 0.6  0.2 - 1.2 mg/dL Final    ALKALINE PHOSPHATASE 07/12/2025 39  36 - 130 U/L Final    AST 07/12/2025 16  10 - 40 U/L Final    ALT 07/12/2025 22  9 - 46 U/L Final    CHOLESTEROL, TOTAL 07/12/2025 232 (H)  <200 mg/dL Final    HDL CHOLESTEROL 07/12/2025 45  > OR = 40 mg/dL Final    TRIGLYCERIDES 07/12/2025 176 (H)  <150 mg/dL Final    LDL-CHOLESTEROL 07/12/2025 156 (H)  mg/dL (calc) Final    Comment: Reference range: <100     Desirable range <100 mg/dL for primary prevention;    <70 mg/dL for patients with CHD or diabetic patients   with > or = 2 CHD risk factors.     LDL-C is now calculated using the Wesly-Stoddard   calculation, which is a validated novel method providing   better accuracy than the Friedewald equation in the   estimation of LDL-C.   Wesly SS et al. ZACHARIAH. 2013;310(19): 5076-8259   (http://education.ToutApp.[employer]/faq/NJW864)      CHOL/HDLC RATIO 07/12/2025 5.2 (H)  <5.0 (calc) Final    NON HDL CHOLESTEROL 07/12/2025 187 (H)  <130 mg/dL (calc) Final    Comment: For patients with diabetes plus 1 major ASCVD risk   factor, treating to a non-HDL-C goal of <100 mg/dL   (LDL-C of <70 mg/dL) is considered a therapeutic   option.      VITAMIN B12 07/12/2025 766  200 - 1,100 pg/mL Final    VITAMIN D,25-OH,TOTAL,IA 07/12/2025 31  30 - 100 ng/mL Final    Comment: Vitamin D Status         25-OH Vitamin D:     Deficiency:                    <20  ng/mL  Insufficiency:             20 - 29 ng/mL  Optimal:                 > or = 30 ng/mL     For 25-OH Vitamin D testing on patients on   D2-supplementation and patients for whom quantitation   of D2 and D3 fractions is required, the QuestAssureD(TM)  25-OH VIT D, (D2,D3), LC/MS/MS is recommended: order   code 28877 (patients >2yrs).     See Note 1     Note 1     For additional information, please refer to   http://education.CyberDefender/faq/FYS528   (This link is being provided for informational/  educational purposes only.)      HEMOGLOBIN A1c 07/12/2025 5.6  <5.7 % Final    Comment: For the purpose of screening for the presence of  diabetes:     <5.7%       Consistent with the absence of diabetes  5.7-6.4%    Consistent with increased risk for diabetes              (prediabetes)  > or =6.5%  Consistent with diabetes     This assay result is consistent with a decreased risk  of diabetes.     Currently, no consensus exists regarding use of  hemoglobin A1c for diagnosis of diabetes in children.     According to American Diabetes Association (ADA)  guidelines, hemoglobin A1c <7.0% represents optimal  control in non-pregnant diabetic patients. Different  metrics may apply to specific patient populations.   Standards of Medical Care in Diabetes(ADA).          eAG (mg/dL) 07/12/2025 114  mg/dL Final    eAG (mmol/L) 07/12/2025 6.3  mmol/L Final     Risk Assessment:  Risk of harm to self: Low Risk -- Risk factors include: anxiety Protective factors include:Denies current suicidal ideation, Denies history of suicide attempts , Future-oriented talk , Willingness to seek help and support , Skills in problem solving, conflict resolution, and nonviolent handling of disputes, Cultural and Voodoo beliefs that discourage suicide and support self-preservation , Interpersonal relationships and supports, e.g., family, friends, peers, community , and Restricted access to firearms or other lethal means of suicide     Risk  of harm to others: Low Risk - Risk factors include: No significant risk factors identified on screening. Protective factors include: Lack of known history of harm to others , Lack of known history of violent ideation , Lack of known access to firearms , Sense of community, availability/access to resources and support , Sense of optimism, hope , Interpersonal competence , Affect regulation , Sense of self-efficacy, internal locus of control , and Positive, pro-social family/peer network     Diagnoses and all orders for this visit:  Unspecified anxiety disorder  Inattention  Other orders  -     Referral to Psychiatry     Discussion:  The pt presents today w/anxious and inattentive sxs, recently failed anxiolytic trial (via PCP).   Longevity/quality of sxs consistent with unspecified anxiety d/o (2-3 yrs).  Endorses inattentive sxs over the same period of time    Impact on functionality AEB BENOIT scoring, mild sleep disturbances, occupational impairments.    Exacerbating factors:  typical psychosocial stressors, sleep  Pt is agreeable to trial prn anxiolytic (propranolol) and guanfacine for attentive sxs.  He will stagger trials to monitor response/efficacy.  Denies SI, no hx of SI/NSSI.      Plan/Recommendations:  Medications:   INITIATE guanfacine 1mg/nightly, for inattentive sxs   TRIAL propranolol 10mgs PRN for acute anxiety, WAIT 1 wk after initiation of guanfacine to trial anxiolytic  Orders:  Navigator-for provision of psychotherapy resources  Follow up:  Virtual, 2 month, 9/25 @ 0900  Call  Psychiatry at [phone] with issues.  For [facility] residents, [facility] [employer] is a 24/7 hotline you can call for assistance [[phone]]. Please call [phone] or go to your closest Emergency Room if you feel unsafe. This includes thoughts of hurting yourself or anyone else, or having other troubles such as hearing voices, seeing visions, or having new and scary thoughts about the people around you.    Review  with patient: Treatment plan reviewed with the patient.  Medication risks/benefit reviewed with the patient  Complementary benefits of psychotherapy      Prep time: 7  Direct patient time: 38  Documentation time: 15  Total time: 60    QUINN Daniels       [1]   Current Outpatient Medications:     busPIRone (Buspar) 10 mg tablet, Take 1 tablet (10 mg) by mouth 2 times a day., Disp: 180 tablet, Rfl: 1  [2]   Past Medical History:  Diagnosis Date    ADHD (attention deficit hyperactivity disorder) 2023    Anxiety 2023

## 2025-08-22 ENCOUNTER — TELEPHONE (OUTPATIENT)
Dept: BEHAVIORAL HEALTH | Facility: CLINIC | Age: 40
End: 2025-08-22
Payer: COMMERCIAL

## 2025-08-25 DIAGNOSIS — R41.840 INATTENTION: ICD-10-CM

## 2025-08-25 DIAGNOSIS — F41.9 UNSPECIFIED ANXIETY DISORDER: ICD-10-CM

## 2025-08-26 DIAGNOSIS — F41.9 UNSPECIFIED ANXIETY DISORDER: ICD-10-CM

## 2025-08-26 DIAGNOSIS — R41.840 INATTENTION: ICD-10-CM

## 2025-08-26 RX ORDER — PROPRANOLOL HYDROCHLORIDE 10 MG/1
TABLET ORAL
Refills: 0 | OUTPATIENT
Start: 2025-08-26

## 2025-08-26 RX ORDER — PROPRANOLOL HYDROCHLORIDE 10 MG/1
10 TABLET ORAL DAILY PRN
Qty: 30 TABLET | Refills: 2 | Status: SHIPPED | OUTPATIENT
Start: 2025-08-26 | End: 2026-08-26

## 2025-08-26 RX ORDER — GUANFACINE 1 MG/1
1 TABLET ORAL NIGHTLY
Qty: 30 TABLET | Refills: 2 | Status: SHIPPED | OUTPATIENT
Start: 2025-08-26 | End: 2025-11-24

## 2025-08-26 RX ORDER — GUANFACINE 1 MG/1
TABLET ORAL
Refills: 0 | OUTPATIENT
Start: 2025-08-26

## 2025-08-27 RX ORDER — PROPRANOLOL HYDROCHLORIDE 10 MG/1
10 TABLET ORAL NIGHTLY PRN
Qty: 30 TABLET | Refills: 0 | OUTPATIENT
Start: 2025-08-27

## 2025-08-27 RX ORDER — GUANFACINE 1 MG/1
TABLET ORAL NIGHTLY
Qty: 30 TABLET | Refills: 0 | OUTPATIENT
Start: 2025-08-27

## 2025-09-02 DIAGNOSIS — F41.1 GAD (GENERALIZED ANXIETY DISORDER): ICD-10-CM

## 2025-09-02 DIAGNOSIS — R41.840 INATTENTION: ICD-10-CM

## 2025-09-02 RX ORDER — GUANFACINE 1 MG/1
1 TABLET ORAL NIGHTLY
Qty: 90 TABLET | Refills: 1 | Status: SHIPPED | OUTPATIENT
Start: 2025-09-02 | End: 2026-03-01

## 2025-09-02 RX ORDER — PROPRANOLOL HYDROCHLORIDE 10 MG/1
10 TABLET ORAL DAILY PRN
Qty: 90 TABLET | Refills: 1 | Status: SHIPPED | OUTPATIENT
Start: 2025-09-02 | End: 2026-09-02

## 2025-09-25 ENCOUNTER — APPOINTMENT (OUTPATIENT)
Dept: BEHAVIORAL HEALTH | Facility: CLINIC | Age: 40
End: 2025-09-25
Payer: COMMERCIAL